# Patient Record
Sex: FEMALE | URBAN - METROPOLITAN AREA
[De-identification: names, ages, dates, MRNs, and addresses within clinical notes are randomized per-mention and may not be internally consistent; named-entity substitution may affect disease eponyms.]

---

## 2020-07-25 ENCOUNTER — COMMUNICATION - HEALTHEAST (OUTPATIENT)
Dept: SCHEDULING | Facility: CLINIC | Age: 80
End: 2020-07-25

## 2021-06-09 NOTE — TELEPHONE ENCOUNTER
With  pt is calling that now pt must be in a mask.  Pt uses oxygen and when in public/gas station and can't breathe with mask on and folks tend not to help her.  Pt states that she is in a group home and no one will help her.  Pt states she has no family that can come, and if they could family and friends are not allowed into the Group Home.  Pt states she does not know the name of her PCP or Clinic.  Pt states she has talked with the Manager of the Group Home without success.  Pt will call Human Services on 7/27/20 when open and initiate a complaint.  Morelia Ellington RN, MA  [facility]    Triage Nurse Advisor